# Patient Record
Sex: MALE | Race: WHITE | Employment: UNEMPLOYED | URBAN - METROPOLITAN AREA
[De-identification: names, ages, dates, MRNs, and addresses within clinical notes are randomized per-mention and may not be internally consistent; named-entity substitution may affect disease eponyms.]

---

## 2018-07-26 ENCOUNTER — HOSPITAL ENCOUNTER (OUTPATIENT)
Dept: OCCUPATIONAL MEDICINE | Age: 53
Discharge: HOME | End: 2018-07-26

## 2018-07-26 DIAGNOSIS — Z00.8 HEALTH EXAMINATION IN POPULATION SURVEY: ICD-10-CM

## 2023-04-10 ENCOUNTER — OFFICE VISIT (OUTPATIENT)
Dept: NEUROLOGY | Age: 58
End: 2023-04-10
Payer: OTHER GOVERNMENT

## 2023-04-10 DIAGNOSIS — R55 SYNCOPE, UNSPECIFIED SYNCOPE TYPE: ICD-10-CM

## 2023-04-10 DIAGNOSIS — R41.3 MEMORY LOSS: ICD-10-CM

## 2023-04-10 DIAGNOSIS — G43.709 CHRONIC MIGRAINE WITHOUT AURA WITHOUT STATUS MIGRAINOSUS, NOT INTRACTABLE: ICD-10-CM

## 2023-04-10 DIAGNOSIS — R41.82 ALTERED MENTAL STATUS, UNSPECIFIED ALTERED MENTAL STATUS TYPE: Primary | ICD-10-CM

## 2023-04-10 PROCEDURE — 95816 EEG AWAKE AND DROWSY: CPT | Performed by: PSYCHIATRY & NEUROLOGY

## 2023-05-03 ENCOUNTER — TELEPHONE (OUTPATIENT)
Dept: NEUROLOGY | Age: 58
End: 2023-05-03

## 2023-05-03 NOTE — TELEPHONE ENCOUNTER
Patient is wanting to speak with you regarding MRI and CTA results. He saw the results and is very concerned.

## 2023-05-04 ENCOUNTER — CLINICAL DOCUMENTATION (OUTPATIENT)
Dept: NEUROLOGY | Age: 58
End: 2023-05-04

## 2023-05-04 NOTE — PROGRESS NOTES
I did call this gentleman and left him a voice message that the CTA of the head and neck look normal there is no blockages of any blood vessels there is some lymphadenopathy of the cervical spine or lymph nodes that are enlarged which certainly may be nothing of any significant degree but I suggested and recommended that he contact his primary physician have him look at the results of the report in regards to the lymph nodes and see what direction we need to going.

## 2023-05-22 DIAGNOSIS — G45.9 TIA (TRANSIENT ISCHEMIC ATTACK): ICD-10-CM

## 2023-08-03 ENCOUNTER — OFFICE VISIT (OUTPATIENT)
Dept: NEUROLOGY | Age: 58
End: 2023-08-03
Payer: OTHER GOVERNMENT

## 2023-08-03 VITALS
HEIGHT: 70 IN | OXYGEN SATURATION: 94 % | SYSTOLIC BLOOD PRESSURE: 117 MMHG | HEART RATE: 83 BPM | BODY MASS INDEX: 37.16 KG/M2 | WEIGHT: 259.6 LBS | DIASTOLIC BLOOD PRESSURE: 79 MMHG

## 2023-08-03 DIAGNOSIS — R55 SYNCOPE, UNSPECIFIED SYNCOPE TYPE: Primary | ICD-10-CM

## 2023-08-03 DIAGNOSIS — G62.9 NEUROPATHY: ICD-10-CM

## 2023-08-03 DIAGNOSIS — R41.3 MEMORY LOSS: ICD-10-CM

## 2023-08-03 DIAGNOSIS — R51.9 NONINTRACTABLE HEADACHE, UNSPECIFIED CHRONICITY PATTERN, UNSPECIFIED HEADACHE TYPE: ICD-10-CM

## 2023-08-03 DIAGNOSIS — E53.8 B12 DEFICIENCY: ICD-10-CM

## 2023-08-03 LAB — VIT B12 SERPL-MCNC: 269 PG/ML (ref 193–986)

## 2023-08-03 PROCEDURE — 99214 OFFICE O/P EST MOD 30 MIN: CPT | Performed by: PSYCHIATRY & NEUROLOGY

## 2023-08-03 RX ORDER — OMEPRAZOLE 40 MG/1
CAPSULE, DELAYED RELEASE ORAL
COMMUNITY
Start: 2023-05-03

## 2023-08-03 RX ORDER — RIZATRIPTAN BENZOATE 10 MG/1
10 TABLET, ORALLY DISINTEGRATING ORAL
Qty: 12 TABLET | Refills: 5 | Status: SHIPPED | OUTPATIENT
Start: 2023-08-03 | End: 2023-08-03

## 2023-08-03 ASSESSMENT — ENCOUNTER SYMPTOMS
RESPIRATORY NEGATIVE: 1
GASTROINTESTINAL NEGATIVE: 1
EYES NEGATIVE: 1

## 2023-08-03 NOTE — PROGRESS NOTES
deficiency  -     Vitamin B12; Future    Memory loss he does have short-term memory loss been going on for about 2 to 3 years with word searching word finding describes things like theresa repeats questions and conversations but no difficulties driving. He was set up for neuropsychiatric testing but he still waiting. The Diagnosis and differential diagnostic considerations, and Rx Tx were reviewed with the patient at length. Orders Placed This Encounter   Procedures    Electrophoresis Protein, Serum     Standing Status:   Future     Standing Expiration Date:   8/3/2024    MONICA, Direct, w/Reflex     Standing Status:   Future     Standing Expiration Date:   8/3/2024    Heavy Metals, Blood     Standing Status:   Future     Standing Expiration Date:   8/3/2024     Order Specific Question:   Patient Race? Answer:       Anti-Hu, Ri, Yo Antibody Profile, Serum     Standing Status:   Future     Standing Expiration Date:   8/3/2024    Vitamin B12     Standing Status:   Future     Standing Expiration Date:   8/3/2024    Sjogren's Ab (SS-A, SS-B)     Standing Status:   Future     Standing Expiration Date:   8/3/2024    Angiotensin Converting Enzyme     Standing Status:   Future     Standing Expiration Date:   8/3/2024          I have spent greater than 50% of visit discussing and counseling of patient  for treatment and diagnostic plan review. Total time30     . Notes: Patient is to continue all medications as directed by prescribing physicians. Continuations on today's visit are made based on the patient's report of current medications.              Dr. Marquez Tejada  Consultation Neurology, Neurodiagnostics and Neurotherapeutics  Neuroelectrophysiology, EEG, EMG  [facility]  [address] [facility], [address]  [place], [address] [place] [address]  Phone: [phone] Fax [phone]

## 2023-08-04 ENCOUNTER — CLINICAL DOCUMENTATION (OUTPATIENT)
Dept: NEUROLOGY | Age: 58
End: 2023-08-04

## 2023-08-04 NOTE — PROGRESS NOTES
I spoke with the patient he is going to call his primary physician. His B12 level is 269 which is just too low.

## 2023-08-05 LAB
ACE SERPL-CCNC: 29 U/L (ref 14–82)
ANA SER QL: NEGATIVE
ANTI-YO (PURKINJE CELL): NEGATIVE TITER
ENA SS-A AB SER-ACNC: 0.2 AI (ref 0–0.9)
ENA SS-B AB SER-ACNC: <0.2 AI (ref 0–0.9)
HU ANTIBODY: NEGATIVE TITER
RI ANTIBODY: NEGATIVE TITER

## 2023-08-07 LAB
ALBUMIN SERPL ELPH-MCNC: 4 G/DL (ref 2.9–4.4)
ALBUMIN/GLOB SERPL: 1.2 (ref 0.7–1.7)
ALPHA1 GLOB SERPL ELPH-MCNC: 0.3 G/DL (ref 0–0.4)
ALPHA2 GLOB SERPL ELPH-MCNC: 0.8 G/DL (ref 0.4–1)
ARSENIC BLD-MCNC: 2 UG/L (ref 0–9)
B-GLOBULIN SERPL ELPH-MCNC: 1.2 G/DL (ref 0.7–1.3)
GAMMA GLOB SERPL ELPH-MCNC: 1.1 G/DL (ref 0.4–1.8)
GLOBULIN SER CALC-MCNC: 3.3 G/DL (ref 2.2–3.9)
HISPANIC?: NO
LEAD BLD-MCNC: 1.4 UG/DL (ref 0–3.4)
M PROTEIN SERPL ELPH-MCNC: NORMAL G/DL
MERCURY BLD-MCNC: <1 UG/L (ref 0–14.9)
PROT SERPL-MCNC: 7.3 G/DL (ref 6–8.5)
RACE: NORMAL
SPECIMEN SOURCE: NORMAL
TEST PURPOSE: NORMAL

## 2023-08-25 ENCOUNTER — TELEPHONE (OUTPATIENT)
Dept: NEUROLOGY | Age: 58
End: 2023-08-25

## 2023-11-27 ENCOUNTER — TELEPHONE (OUTPATIENT)
Dept: NEUROLOGY | Age: 58
End: 2023-11-27

## 2023-11-27 NOTE — TELEPHONE ENCOUNTER
[facility] called [facility] Whitfield request for more services for the Cardiology and external neurology referral  he put in on 8/03/2023. [facility] will not be able to [facility] those [facility] until a request is put in by . Thank You

## 2024-03-25 ENCOUNTER — CLINICAL DOCUMENTATION (OUTPATIENT)
Dept: NEUROLOGY | Age: 59
End: 2024-03-25

## 2024-03-25 NOTE — PROGRESS NOTES
The patient was seen and evaluated at [facility].  I am not sure if they have any plans or treatment course.  They certainly did evaluate the patient.